# Patient Record
Sex: FEMALE | Race: WHITE | Employment: UNEMPLOYED | URBAN - METROPOLITAN AREA
[De-identification: names, ages, dates, MRNs, and addresses within clinical notes are randomized per-mention and may not be internally consistent; named-entity substitution may affect disease eponyms.]

---

## 2019-07-12 ENCOUNTER — OFFICE VISIT (OUTPATIENT)
Dept: FAMILY MEDICINE CLINIC | Age: 69
End: 2019-07-12
Payer: COMMERCIAL

## 2019-07-12 VITALS
SYSTOLIC BLOOD PRESSURE: 122 MMHG | BODY MASS INDEX: 24.2 KG/M2 | RESPIRATION RATE: 16 BRPM | WEIGHT: 136.6 LBS | OXYGEN SATURATION: 98 % | TEMPERATURE: 97.4 F | DIASTOLIC BLOOD PRESSURE: 76 MMHG | HEART RATE: 83 BPM | HEIGHT: 63 IN

## 2019-07-12 DIAGNOSIS — R00.2 PALPITATION: ICD-10-CM

## 2019-07-12 DIAGNOSIS — Z00.00 WELL ADULT EXAM: ICD-10-CM

## 2019-07-12 DIAGNOSIS — K76.0 FATTY LIVER: ICD-10-CM

## 2019-07-12 DIAGNOSIS — E78.5 HYPERLIPIDEMIA, UNSPECIFIED HYPERLIPIDEMIA TYPE: Primary | ICD-10-CM

## 2019-07-12 DIAGNOSIS — Z23 NEED FOR VACCINATION: ICD-10-CM

## 2019-07-12 PROCEDURE — 90472 IMMUNIZATION ADMIN EACH ADD: CPT | Performed by: FAMILY MEDICINE

## 2019-07-12 PROCEDURE — 90632 HEPA VACCINE ADULT IM: CPT | Performed by: FAMILY MEDICINE

## 2019-07-12 PROCEDURE — 99203 OFFICE O/P NEW LOW 30 MIN: CPT | Performed by: FAMILY MEDICINE

## 2019-07-12 PROCEDURE — 90471 IMMUNIZATION ADMIN: CPT | Performed by: FAMILY MEDICINE

## 2019-07-12 PROCEDURE — 90746 HEPB VACCINE 3 DOSE ADULT IM: CPT | Performed by: FAMILY MEDICINE

## 2019-07-12 RX ORDER — ERYTHROMYCIN 5 MG/G
OINTMENT OPHTHALMIC
Refills: 1 | COMMUNITY
Start: 2019-04-08

## 2019-07-12 RX ORDER — OFLOXACIN 3 MG/ML
SOLUTION/ DROPS OPHTHALMIC
Refills: 1 | COMMUNITY
Start: 2019-06-13

## 2019-07-12 RX ORDER — SERTRALINE HYDROCHLORIDE 100 MG/1
TABLET, FILM COATED ORAL
COMMUNITY
Start: 2018-02-19 | End: 2019-07-12 | Stop reason: ALTCHOICE

## 2019-07-12 RX ORDER — ESOMEPRAZOLE MAGNESIUM 20 MG/1
TABLET, DELAYED RELEASE ORAL
COMMUNITY
End: 2019-11-20 | Stop reason: SDUPTHER

## 2019-07-12 RX ORDER — LEVOTHYROXINE SODIUM 88 UG/1
TABLET ORAL
COMMUNITY
Start: 2015-11-30 | End: 2019-10-18 | Stop reason: SDUPTHER

## 2019-07-12 RX ORDER — TRAZODONE HYDROCHLORIDE 50 MG/1
50 TABLET ORAL
COMMUNITY
Start: 2015-06-30 | End: 2019-07-12 | Stop reason: ALTCHOICE

## 2019-07-12 RX ORDER — AMITRIPTYLINE HYDROCHLORIDE 25 MG/1
TABLET, FILM COATED ORAL
Refills: 0 | COMMUNITY
Start: 2019-04-26 | End: 2020-06-04 | Stop reason: CLARIF

## 2019-07-12 SDOH — HEALTH STABILITY: MENTAL HEALTH: HOW OFTEN DO YOU HAVE A DRINK CONTAINING ALCOHOL?: NEVER

## 2019-07-12 ASSESSMENT — ENCOUNTER SYMPTOMS
COUGH: 0
CHEST TIGHTNESS: 0
BACK PAIN: 0
ABDOMINAL PAIN: 0
CHANGE IN BOWEL HABIT: 0
NAUSEA: 0
SHORTNESS OF BREATH: 0
VOMITING: 0

## 2019-07-12 ASSESSMENT — PATIENT HEALTH QUESTIONNAIRE - PHQ9
2. FEELING DOWN, DEPRESSED OR HOPELESS: 0
1. LITTLE INTEREST OR PLEASURE IN DOING THINGS: 0
SUM OF ALL RESPONSES TO PHQ9 QUESTIONS 1 & 2: 0
SUM OF ALL RESPONSES TO PHQ QUESTIONS 1-9: 0
SUM OF ALL RESPONSES TO PHQ QUESTIONS 1-9: 0

## 2019-07-25 LAB
A/G RATIO: 1.4 (ref 1.1–2.2)
ALBUMIN SERPL-MCNC: 4 G/DL (ref 3.4–5)
ALP BLD-CCNC: 105 U/L (ref 40–129)
ALT SERPL-CCNC: 13 U/L (ref 10–40)
ANION GAP SERPL CALCULATED.3IONS-SCNC: 12 MMOL/L (ref 3–16)
AST SERPL-CCNC: 17 U/L (ref 15–37)
BASOPHILS ABSOLUTE: 0 K/UL (ref 0–0.2)
BASOPHILS RELATIVE PERCENT: 0.6 %
BILIRUB SERPL-MCNC: <0.2 MG/DL (ref 0–1)
BUN BLDV-MCNC: 16 MG/DL (ref 7–20)
CALCIUM SERPL-MCNC: 9.1 MG/DL (ref 8.3–10.6)
CHLORIDE BLD-SCNC: 101 MMOL/L (ref 99–110)
CHOLESTEROL, TOTAL: 216 MG/DL (ref 0–199)
CO2: 27 MMOL/L (ref 21–32)
CREAT SERPL-MCNC: 0.6 MG/DL (ref 0.6–1.2)
EOSINOPHILS ABSOLUTE: 0.1 K/UL (ref 0–0.6)
EOSINOPHILS RELATIVE PERCENT: 2.6 %
GFR AFRICAN AMERICAN: >60
GFR NON-AFRICAN AMERICAN: >60
GLOBULIN: 2.9 G/DL
GLUCOSE BLD-MCNC: 90 MG/DL (ref 70–99)
HCT VFR BLD CALC: 34.7 % (ref 36–48)
HDLC SERPL-MCNC: 46 MG/DL (ref 40–60)
HEMOGLOBIN: 11.2 G/DL (ref 12–16)
HEPATITIS C ANTIBODY INTERPRETATION: NORMAL
LDL CHOLESTEROL CALCULATED: 151 MG/DL
LYMPHOCYTES ABSOLUTE: 2.2 K/UL (ref 1–5.1)
LYMPHOCYTES RELATIVE PERCENT: 41.7 %
MCH RBC QN AUTO: 28.9 PG (ref 26–34)
MCHC RBC AUTO-ENTMCNC: 32.4 G/DL (ref 31–36)
MCV RBC AUTO: 89.5 FL (ref 80–100)
MONOCYTES ABSOLUTE: 0.4 K/UL (ref 0–1.3)
MONOCYTES RELATIVE PERCENT: 7 %
NEUTROPHILS ABSOLUTE: 2.6 K/UL (ref 1.7–7.7)
NEUTROPHILS RELATIVE PERCENT: 48.1 %
PDW BLD-RTO: 15 % (ref 12.4–15.4)
PLATELET # BLD: 389 K/UL (ref 135–450)
PMV BLD AUTO: 7.7 FL (ref 5–10.5)
POTASSIUM SERPL-SCNC: 4.7 MMOL/L (ref 3.5–5.1)
RBC # BLD: 3.88 M/UL (ref 4–5.2)
SODIUM BLD-SCNC: 140 MMOL/L (ref 136–145)
TOTAL PROTEIN: 6.9 G/DL (ref 6.4–8.2)
TRIGL SERPL-MCNC: 97 MG/DL (ref 0–150)
TSH SERPL DL<=0.05 MIU/L-ACNC: 8.08 UIU/ML (ref 0.27–4.2)
VLDLC SERPL CALC-MCNC: 19 MG/DL
WBC # BLD: 5.4 K/UL (ref 4–11)

## 2019-07-30 ENCOUNTER — TELEPHONE (OUTPATIENT)
Dept: FAMILY MEDICINE CLINIC | Age: 69
End: 2019-07-30

## 2019-07-30 RX ORDER — AMITRIPTYLINE HYDROCHLORIDE 25 MG/1
25 TABLET, FILM COATED ORAL NIGHTLY
Qty: 30 TABLET | Refills: 0 | Status: SHIPPED | OUTPATIENT
Start: 2019-07-30 | End: 2019-08-28 | Stop reason: SDUPTHER

## 2019-07-30 RX ORDER — AMITRIPTYLINE HYDROCHLORIDE 25 MG/1
TABLET, FILM COATED ORAL
Qty: 30 TABLET | Refills: 0 | Status: CANCELLED | OUTPATIENT
Start: 2019-07-30

## 2019-08-19 ENCOUNTER — TELEPHONE (OUTPATIENT)
Dept: FAMILY MEDICINE CLINIC | Age: 69
End: 2019-08-19

## 2019-08-19 ENCOUNTER — OFFICE VISIT (OUTPATIENT)
Dept: FAMILY MEDICINE CLINIC | Age: 69
End: 2019-08-19
Payer: COMMERCIAL

## 2019-08-19 VITALS
DIASTOLIC BLOOD PRESSURE: 78 MMHG | BODY MASS INDEX: 24.36 KG/M2 | OXYGEN SATURATION: 98 % | WEIGHT: 137.5 LBS | HEIGHT: 63 IN | HEART RATE: 78 BPM | SYSTOLIC BLOOD PRESSURE: 120 MMHG

## 2019-08-19 DIAGNOSIS — D50.9 IRON DEFICIENCY ANEMIA, UNSPECIFIED IRON DEFICIENCY ANEMIA TYPE: ICD-10-CM

## 2019-08-19 DIAGNOSIS — E03.9 HYPOTHYROIDISM, UNSPECIFIED TYPE: ICD-10-CM

## 2019-08-19 DIAGNOSIS — E78.5 HYPERLIPIDEMIA, UNSPECIFIED HYPERLIPIDEMIA TYPE: ICD-10-CM

## 2019-08-19 DIAGNOSIS — I47.1 SVT (SUPRAVENTRICULAR TACHYCARDIA) (HCC): Primary | ICD-10-CM

## 2019-08-19 PROCEDURE — 99214 OFFICE O/P EST MOD 30 MIN: CPT | Performed by: FAMILY MEDICINE

## 2019-08-19 RX ORDER — EZETIMIBE 10 MG/1
TABLET ORAL
COMMUNITY
Start: 2019-07-24 | End: 2019-11-20 | Stop reason: SDUPTHER

## 2019-08-19 RX ORDER — LANOLIN ALCOHOL/MO/W.PET/CERES
CREAM (GRAM) TOPICAL
Qty: 45 TABLET | Refills: 3 | Status: SHIPPED | OUTPATIENT
Start: 2019-08-19 | End: 2019-11-20 | Stop reason: SDUPTHER

## 2019-08-19 ASSESSMENT — ENCOUNTER SYMPTOMS
VOMITING: 0
NAUSEA: 0
SHORTNESS OF BREATH: 1
RECTAL PAIN: 0
BLOOD IN STOOL: 0
ABDOMINAL DISTENTION: 0
COUGH: 0

## 2019-08-19 NOTE — PROGRESS NOTES
weakness and light-headedness. Negative for numbness. Psychiatric/Behavioral: The patient is not nervous/anxious. is allergic to simvastatin; lactose; and sulfa antibiotics. Current Outpatient Medications:     diltiazem (CARDIZEM) 30 MG tablet, Take 30 mg every 6 hours as needed for tachycardia, maximum daily dose 120 mg, Disp: , Rfl:     ezetimibe (ZETIA) 10 MG tablet, Take one tablet by mouth every day., Disp: , Rfl:     ferrous sulfate (FE TABS) 325 (65 Fe) MG EC tablet, Take one twice a week, Disp: 45 tablet, Rfl: 3    amitriptyline (ELAVIL) 25 MG tablet, Take 1 tablet by mouth nightly, Disp: 30 tablet, Rfl: 0    Esomeprazole Magnesium 20 MG TBEC, Take by mouth, Disp: , Rfl:     amitriptyline (ELAVIL) 25 MG tablet, TK 1 T PO HS, Disp: , Rfl: 0    erythromycin (ROMYCIN) 5 MG/GM ophthalmic ointment, , Disp: , Rfl: 1    levothyroxine (SYNTHROID) 88 MCG tablet, TAKE 1 TABLET BY MOUTH DAILY, Disp: , Rfl:     Multiple Vitamins-Minerals (WOMENS MULTI VITAMIN & MINERAL) TABS, daily, Disp: , Rfl:     ofloxacin (OCUFLOX) 0.3 % solution, INT 1 GTT IN OS BID, Disp: , Rfl: 1    vitamin E 100 units capsule, Take 100 Units by mouth, Disp: , Rfl:      has a past medical history of Fatty liver, Generalized osteoarthritis, Hyperlipidemia, Hypothyroid, Interstitial cystitis, and Rosacea. Past Surgical History:   Procedure Laterality Date    BLEPHAROPLASTY      BREAST LUMPECTOMY Left     WRIST SURGERY Left         reports that she has never smoked. She has never used smokeless tobacco. She reports that she does not drink alcohol or use drugs. family history is not on file. Objective:  Blood pressure 120/78, pulse 78, height 5' 3\" (1.6 m), weight 137 lb 8 oz (62.4 kg), SpO2 98 %, not currently breastfeeding. Physical Exam   Constitutional: She is oriented to person, place, and time. She appears well-developed and well-nourished. No distress. HENT:   Head: Normocephalic.    Mouth/Throat: Oropharynx is clear and moist.   Eyes: Pupils are equal, round, and reactive to light. Conjunctivae and EOM are normal. No scleral icterus. Neck: Normal range of motion. Neck supple. No JVD present. No thyromegaly present. No carotid bruits   Cardiovascular: Normal rate, regular rhythm, normal heart sounds and intact distal pulses. Exam reveals no gallop and no friction rub. No murmur heard. Pulses:       Carotid pulses are 2+ on the right side, and 2+ on the left side. No edema     Pulmonary/Chest: Effort normal. No respiratory distress. She has no wheezes. She has no rales. She exhibits no tenderness. Abdominal: Soft. Bowel sounds are normal. She exhibits no mass. There is no tenderness. Musculoskeletal: She exhibits no edema. Lymphadenopathy:     She has no cervical adenopathy. Neurological: She is alert and oriented to person, place, and time. She has normal reflexes. No cranial nerve deficit. She exhibits normal muscle tone. Skin: Skin is warm. Capillary refill takes less than 2 seconds. No pallor. Psychiatric: She has a normal mood and affect. Her behavior is normal. Judgment and thought content normal.   Nursing note and vitals reviewed. Assessment:             Diagnosis Orders   1. SVT (supraventricular tachycardia) ([address]     2. Hyperlipidemia, unspecified hyperlipidemia type     3. Iron deficiency anemia, unspecified iron deficiency anemia type     4. Hypothyroidism, unspecified type         Plan:      1. Continue diltiazem 120 mg XL and 30 mg for breakthrough sx   Fu with cardiology  Holter results pending    2. Continue Zetia     3. Needs fu colonoscopy   Will call gi after cardiology consult  Take iron twice per week    4.  Continue synthroid  Adjust dose once consult with cardiology  patient agrees and verbalizes understanding    Fu 3 mo            Luis Norton MD

## 2019-08-29 RX ORDER — AMITRIPTYLINE HYDROCHLORIDE 25 MG/1
TABLET, FILM COATED ORAL
Qty: 30 TABLET | Refills: 5 | Status: SHIPPED | OUTPATIENT
Start: 2019-08-29 | End: 2019-11-20 | Stop reason: SDUPTHER

## 2019-09-16 ENCOUNTER — HOSPITAL ENCOUNTER (OUTPATIENT)
Dept: MAMMOGRAPHY | Age: 69
Discharge: HOME | End: 2019-09-21
Payer: COMMERCIAL

## 2019-09-16 DIAGNOSIS — Z12.31 VISIT FOR SCREENING MAMMOGRAM: ICD-10-CM

## 2019-09-16 PROCEDURE — 77063 BREAST TOMOSYNTHESIS BI: CPT

## 2019-10-21 RX ORDER — LEVOTHYROXINE SODIUM 88 UG/1
TABLET ORAL
Qty: 90 TABLET | Refills: 1 | Status: SHIPPED | OUTPATIENT
Start: 2019-10-21 | End: 2019-11-20 | Stop reason: SDUPTHER

## 2019-10-22 RX ORDER — IPRATROPIUM BROMIDE 21 UG/1
SPRAY, METERED NASAL
Qty: 30 ML | Refills: 0 | Status: SHIPPED | OUTPATIENT
Start: 2019-10-22 | End: 2019-11-04 | Stop reason: SDUPTHER

## 2019-11-04 RX ORDER — IPRATROPIUM BROMIDE 21 UG/1
SPRAY, METERED NASAL
Qty: 30 ML | Refills: 0 | Status: SHIPPED | OUTPATIENT
Start: 2019-11-04 | End: 2020-07-10 | Stop reason: SDUPTHER

## 2019-11-20 ENCOUNTER — OFFICE VISIT (OUTPATIENT)
Dept: FAMILY MEDICINE CLINIC | Age: 69
End: 2019-11-20
Payer: COMMERCIAL

## 2019-11-20 VITALS
TEMPERATURE: 98.1 F | HEART RATE: 77 BPM | OXYGEN SATURATION: 98 % | WEIGHT: 139.5 LBS | HEIGHT: 63 IN | SYSTOLIC BLOOD PRESSURE: 128 MMHG | DIASTOLIC BLOOD PRESSURE: 74 MMHG | BODY MASS INDEX: 24.72 KG/M2 | RESPIRATION RATE: 16 BRPM

## 2019-11-20 DIAGNOSIS — E53.8 VITAMIN B12 DEFICIENCY: ICD-10-CM

## 2019-11-20 DIAGNOSIS — I47.1 SVT (SUPRAVENTRICULAR TACHYCARDIA) (HCC): ICD-10-CM

## 2019-11-20 DIAGNOSIS — K21.9 GASTROESOPHAGEAL REFLUX DISEASE WITHOUT ESOPHAGITIS: ICD-10-CM

## 2019-11-20 DIAGNOSIS — E55.9 VITAMIN D DEFICIENCY: ICD-10-CM

## 2019-11-20 DIAGNOSIS — K76.0 FATTY LIVER: ICD-10-CM

## 2019-11-20 DIAGNOSIS — E78.5 HYPERLIPIDEMIA, UNSPECIFIED HYPERLIPIDEMIA TYPE: Primary | ICD-10-CM

## 2019-11-20 DIAGNOSIS — E03.9 HYPOTHYROIDISM, UNSPECIFIED TYPE: ICD-10-CM

## 2019-11-20 DIAGNOSIS — D50.9 IRON DEFICIENCY ANEMIA, UNSPECIFIED IRON DEFICIENCY ANEMIA TYPE: ICD-10-CM

## 2019-11-20 DIAGNOSIS — F51.04 CHRONIC INSOMNIA: ICD-10-CM

## 2019-11-20 PROCEDURE — 99215 OFFICE O/P EST HI 40 MIN: CPT | Performed by: FAMILY MEDICINE

## 2019-11-20 RX ORDER — EZETIMIBE 10 MG/1
TABLET ORAL
Qty: 90 TABLET | Refills: 2 | Status: SHIPPED | OUTPATIENT
Start: 2019-11-20

## 2019-11-20 RX ORDER — LANOLIN ALCOHOL/MO/W.PET/CERES
CREAM (GRAM) TOPICAL
Qty: 45 TABLET | Refills: 3 | Status: SHIPPED | OUTPATIENT
Start: 2019-11-20

## 2019-11-20 RX ORDER — AMITRIPTYLINE HYDROCHLORIDE 25 MG/1
TABLET, FILM COATED ORAL
Qty: 30 TABLET | Refills: 5 | Status: SHIPPED | OUTPATIENT
Start: 2019-11-20 | End: 2020-06-04 | Stop reason: CLARIF

## 2019-11-20 RX ORDER — ESOMEPRAZOLE MAGNESIUM 20 MG/1
TABLET, DELAYED RELEASE ORAL
Qty: 90 TABLET | Refills: 0 | Status: SHIPPED | OUTPATIENT
Start: 2019-11-20 | End: 2020-04-17

## 2019-11-20 RX ORDER — LEVOTHYROXINE SODIUM 88 UG/1
TABLET ORAL
Qty: 90 TABLET | Refills: 1 | Status: SHIPPED | OUTPATIENT
Start: 2019-11-20 | End: 2020-07-24

## 2019-11-20 ASSESSMENT — ENCOUNTER SYMPTOMS
NAUSEA: 0
CHOKING: 0
SWOLLEN GLANDS: 0
STRIDOR: 0
BELCHING: 0
WHEEZING: 0
ABDOMINAL PAIN: 0
HEARTBURN: 1
GLOBUS SENSATION: 0
VISUAL CHANGE: 0
COUGH: 0
CHANGE IN BOWEL HABIT: 0
HOARSE VOICE: 0
WATER BRASH: 0
SORE THROAT: 0
VOMITING: 0

## 2019-11-20 ASSESSMENT — PATIENT HEALTH QUESTIONNAIRE - PHQ9
1. LITTLE INTEREST OR PLEASURE IN DOING THINGS: 0
2. FEELING DOWN, DEPRESSED OR HOPELESS: 0
SUM OF ALL RESPONSES TO PHQ9 QUESTIONS 1 & 2: 0
SUM OF ALL RESPONSES TO PHQ QUESTIONS 1-9: 0
SUM OF ALL RESPONSES TO PHQ QUESTIONS 1-9: 0

## 2019-11-21 ASSESSMENT — ENCOUNTER SYMPTOMS
CONSTIPATION: 0
DIARRHEA: 0
SHORTNESS OF BREATH: 0
BLOOD IN STOOL: 0
ABDOMINAL DISTENTION: 0
CHEST TIGHTNESS: 0

## 2019-12-19 ENCOUNTER — HOSPITAL ENCOUNTER (OUTPATIENT)
Dept: ULTRASOUND IMAGING | Age: 69
Discharge: HOME | End: 2019-12-19
Payer: COMMERCIAL

## 2019-12-19 DIAGNOSIS — K76.0 FATTY LIVER: ICD-10-CM

## 2019-12-19 PROCEDURE — 76705 ECHO EXAM OF ABDOMEN: CPT

## 2020-04-23 ENCOUNTER — TELEMEDICINE (OUTPATIENT)
Dept: FAMILY MEDICINE CLINIC | Age: 70
End: 2020-04-23
Payer: COMMERCIAL

## 2020-04-23 ENCOUNTER — E-VISIT (OUTPATIENT)
Dept: FAMILY MEDICINE CLINIC | Age: 70
End: 2020-04-23

## 2020-04-23 PROCEDURE — 99214 OFFICE O/P EST MOD 30 MIN: CPT | Performed by: FAMILY MEDICINE

## 2020-04-23 RX ORDER — AMOXICILLIN AND CLAVULANATE POTASSIUM 875; 125 MG/1; MG/1
1 TABLET, FILM COATED ORAL 2 TIMES DAILY
Qty: 14 TABLET | Refills: 0 | Status: SHIPPED | OUTPATIENT
Start: 2020-04-23 | End: 2020-04-30

## 2020-04-23 RX ORDER — FLUTICASONE PROPIONATE 50 MCG
2 SPRAY, SUSPENSION (ML) NASAL DAILY
Qty: 1 BOTTLE | Refills: 3 | Status: SHIPPED | OUTPATIENT
Start: 2020-04-23 | End: 2020-09-17 | Stop reason: ALTCHOICE

## 2020-04-23 RX ORDER — AMITRIPTYLINE HYDROCHLORIDE 25 MG/1
25 TABLET, FILM COATED ORAL NIGHTLY
Qty: 30 TABLET | Refills: 0 | Status: SHIPPED | OUTPATIENT
Start: 2020-04-23 | End: 2020-06-04 | Stop reason: CLARIF

## 2020-04-23 ASSESSMENT — ENCOUNTER SYMPTOMS
SINUS PRESSURE: 1
SORE THROAT: 0
SHORTNESS OF BREATH: 0
COUGH: 1
SWOLLEN GLANDS: 0
HOARSE VOICE: 0
EYE ITCHING: 0

## 2020-04-23 NOTE — PROGRESS NOTES
Sent a [employer] informing pt. Including the RRsat virtual visit guide. Also left a message on pt's vm to call back to schedule.

## 2020-04-23 NOTE — PROGRESS NOTES
Cancel myChart encounter  Needs VV to discuss medicsal tx in case I have to prescribe controlled med

## 2020-04-23 NOTE — PROGRESS NOTES
daily, Disp: 1 Bottle, Rfl: 3    amitriptyline (ELAVIL) 25 MG tablet, Take 1 tablet by mouth nightly, Disp: 30 tablet, Rfl: 0    esomeprazole (NEXIUM) 20 MG delayed release capsule, TAKE 1 CAPSULE BY MOUTH EVERY MORNING, Disp: 90 capsule, Rfl: 1    amitriptyline (ELAVIL) 25 MG tablet, TAKE 1 TABLET BY MOUTH EVERY NIGHT, Disp: 30 tablet, Rfl: 5    ferrous sulfate (FE TABS) 325 (65 Fe) MG EC tablet, Take one twice a week, Disp: 45 tablet, Rfl: 3    levothyroxine (SYNTHROID) 88 MCG tablet, TAKE 1 TABLET BY MOUTH DAILY, Disp: 90 tablet, Rfl: 1    ezetimibe (ZETIA) 10 MG tablet, Take one tablet by mouth every day., Disp: 90 tablet, Rfl: 2    ipratropium (ATROVENT) 0.03 % nasal spray, USE 2 SPRAYS IN EACH NOSTRIL THREE TIMES DAILY, Disp: 30 mL, Rfl: 0    diltiazem (CARDIZEM) 30 MG tablet, Take 30 mg every 6 hours as needed for tachycardia, maximum daily dose 120 mg, Disp: , Rfl:     amitriptyline (ELAVIL) 25 MG tablet, TK 1 T PO HS, Disp: , Rfl: 0    erythromycin (ROMYCIN) 5 MG/GM ophthalmic ointment, , Disp: , Rfl: 1    Multiple Vitamins-Minerals (WOMENS MULTI VITAMIN & MINERAL) TABS, daily, Disp: , Rfl:     ofloxacin (OCUFLOX) 0.3 % solution, INT 1 GTT IN OS BID, Disp: , Rfl: 1    vitamin E 100 units capsule, Take 100 Units by mouth, Disp: , Rfl:      has a past medical history of Fatty liver, Generalized osteoarthritis, Hyperlipidemia, Hypothyroid, Interstitial cystitis, and Rosacea. Past Surgical History:   Procedure Laterality Date    BLEPHAROPLASTY      BREAST LUMPECTOMY Left     WRIST SURGERY Left         reports that she has never smoked. She has never used smokeless tobacco. She reports that she does not drink alcohol or use drugs. family history is not on file. Objective:   Physical Exam  Constitutional:       General: She is not in acute distress. Appearance: Normal appearance. She is not ill-appearing, toxic-appearing or diaphoretic. HENT:      Head: Normocephalic and atraumatic. Vitals/Constitutional/EENT/Resp/CV/GI//MS/Neuro/Skin/Heme-Lymph-Imm. Pursuant to the emergency declaration under the [address] [place] [address] and the [employer] Act, this Virtual Visit was conducted with patient's (and/or legal guardian's) consent, to reduce the patient's risk of exposure to COVID-19 and provide necessary medical care. The patient (and/or legal guardian) has also been advised to contact this office for worsening conditions or problems, and seek emergency medical treatment and/or call 911 if deemed necessary. Patient identification was verified at the start of the visit: Yes    Total time spent for this encounter: NA  Services were provided through a video synchronous discussion virtually to substitute for in-person clinic visit. Patient and provider were located at their individual homes. --Leretha Jeans, MD on 4/23/2020 at 1:38 PM    An electronic signature was used to authenticate this note.     Leretha Jeans, MD

## 2020-05-28 ENCOUNTER — TELEPHONE (OUTPATIENT)
Dept: FAMILY MEDICINE CLINIC | Age: 70
End: 2020-05-28

## 2020-05-28 RX ORDER — AMITRIPTYLINE HYDROCHLORIDE 25 MG/1
TABLET, FILM COATED ORAL
Qty: 30 TABLET | Refills: 0 | Status: CANCELLED | OUTPATIENT
Start: 2020-05-28

## 2020-05-29 RX ORDER — AMITRIPTYLINE HYDROCHLORIDE 50 MG/1
50 TABLET, FILM COATED ORAL NIGHTLY
Qty: 90 TABLET | Refills: 1 | Status: SHIPPED | OUTPATIENT
Start: 2020-05-29

## 2020-06-04 ENCOUNTER — VIRTUAL VISIT (OUTPATIENT)
Dept: FAMILY MEDICINE CLINIC | Age: 70
End: 2020-06-04
Payer: COMMERCIAL

## 2020-06-04 VITALS
BODY MASS INDEX: 24.24 KG/M2 | HEART RATE: 79 BPM | WEIGHT: 142 LBS | HEIGHT: 64 IN | DIASTOLIC BLOOD PRESSURE: 82 MMHG | SYSTOLIC BLOOD PRESSURE: 144 MMHG

## 2020-06-04 PROCEDURE — 99213 OFFICE O/P EST LOW 20 MIN: CPT | Performed by: FAMILY MEDICINE

## 2020-06-04 RX ORDER — ZOLPIDEM TARTRATE 5 MG/1
2.5 TABLET ORAL NIGHTLY PRN
Qty: 15 TABLET | Refills: 1 | Status: SHIPPED | OUTPATIENT
Start: 2020-06-04 | End: 2020-07-04

## 2020-06-04 ASSESSMENT — PATIENT HEALTH QUESTIONNAIRE - PHQ9
1. LITTLE INTEREST OR PLEASURE IN DOING THINGS: 0
2. FEELING DOWN, DEPRESSED OR HOPELESS: 0
SUM OF ALL RESPONSES TO PHQ QUESTIONS 1-9: 0
SUM OF ALL RESPONSES TO PHQ9 QUESTIONS 1 & 2: 0
SUM OF ALL RESPONSES TO PHQ QUESTIONS 1-9: 0

## 2020-06-04 NOTE — PROGRESS NOTES
TABS) 325 (65 Fe) MG EC tablet, Take one twice a week, Disp: 45 tablet, Rfl: 3    levothyroxine (SYNTHROID) 88 MCG tablet, TAKE 1 TABLET BY MOUTH DAILY, Disp: 90 tablet, Rfl: 1    ezetimibe (ZETIA) 10 MG tablet, Take one tablet by mouth every day., Disp: 90 tablet, Rfl: 2    ipratropium (ATROVENT) 0.03 % nasal spray, USE 2 SPRAYS IN EACH NOSTRIL THREE TIMES DAILY, Disp: 30 mL, Rfl: 0    diltiazem (CARDIZEM) 30 MG tablet, Take 30 mg every 6 hours as needed for tachycardia, maximum daily dose 120 mg, Disp: , Rfl:     erythromycin (ROMYCIN) 5 MG/GM ophthalmic ointment, , Disp: , Rfl: 1    Multiple Vitamins-Minerals (WOMENS MULTI VITAMIN & MINERAL) TABS, daily, Disp: , Rfl:     ofloxacin (OCUFLOX) 0.3 % solution, INT 1 GTT IN OS BID, Disp: , Rfl: 1    vitamin E 100 units capsule, Take 100 Units by mouth, Disp: , Rfl:      has a past medical history of Fatty liver, Generalized osteoarthritis, Hyperlipidemia, Hypothyroid, Interstitial cystitis, and Rosacea. Past Surgical History:   Procedure Laterality Date    BLEPHAROPLASTY      BREAST LUMPECTOMY Left     WRIST SURGERY Left         reports that she has never smoked. She has never used smokeless tobacco. She reports that she does not drink alcohol or use drugs. family history is sig     Objective:  Blood pressure (!) 144/82, pulse 79, height 5' 4\" (1.626 m), weight 142 lb (64.4 kg), not currently breastfeeding. Physical Exam  Constitutional:       General: She is not in acute distress. HENT:      Head:      Comments: Hearing intact to nml conversation     Neurological:      Mental Status: She is alert and oriented to person, place, and time. Psychiatric:         Mood and Affect: Mood normal.         Behavior: Behavior normal.         Thought Content: Thought content normal.         Judgment: Judgment normal.         Assessment:       Diagnosis Orders   1.  Chronic insomnia  zolpidem (AMBIEN) 5 MG tablet           Plan:      Sleep hygiene reviewed

## 2020-06-08 ENCOUNTER — TELEPHONE (OUTPATIENT)
Dept: FAMILY MEDICINE CLINIC | Age: 70
End: 2020-06-08

## 2020-06-08 RX ORDER — AMOXICILLIN 875 MG/1
875 TABLET, COATED ORAL 2 TIMES DAILY
Qty: 20 TABLET | Refills: 0 | Status: SHIPPED | OUTPATIENT
Start: 2020-06-08 | End: 2020-06-18

## 2020-06-08 NOTE — TELEPHONE ENCOUNTER
Patients  is calling stating that patient started with a sinus infection and she had penicillin from the dentis left over so she took them and started feeling better but is now out and feeling bad again.  He symptoms pain and swelling in face, no fever, sore throat, nasal congestion  Since yesterday morning    Please advise  Jessie Cannon [phone]

## 2020-07-10 ENCOUNTER — VIRTUAL VISIT (OUTPATIENT)
Dept: FAMILY MEDICINE CLINIC | Age: 70
End: 2020-07-10
Payer: COMMERCIAL

## 2020-07-10 PROCEDURE — 99443 PR PHYS/QHP TELEPHONE EVALUATION 21-30 MIN: CPT | Performed by: FAMILY MEDICINE

## 2020-07-10 RX ORDER — QUETIAPINE FUMARATE 25 MG/1
25 TABLET, FILM COATED ORAL 2 TIMES DAILY
Qty: 60 TABLET | Refills: 3 | Status: SHIPPED | OUTPATIENT
Start: 2020-07-10 | End: 2020-11-23 | Stop reason: SDUPTHER

## 2020-07-10 RX ORDER — IPRATROPIUM BROMIDE 21 UG/1
SPRAY, METERED NASAL
Qty: 30 ML | Refills: 0 | Status: SHIPPED | OUTPATIENT
Start: 2020-07-10

## 2020-07-10 ASSESSMENT — ENCOUNTER SYMPTOMS
ABDOMINAL PAIN: 0
CHANGE IN BOWEL HABIT: 0
SORE THROAT: 0
SWOLLEN GLANDS: 0
CHEST TIGHTNESS: 0
EYE ITCHING: 0
VOMITING: 0
VISUAL CHANGE: 0
COUGH: 0
NAUSEA: 1
SHORTNESS OF BREATH: 0

## 2020-07-10 ASSESSMENT — PATIENT HEALTH QUESTIONNAIRE - PHQ9
SUM OF ALL RESPONSES TO PHQ QUESTIONS 1-9: 2
SUM OF ALL RESPONSES TO PHQ9 QUESTIONS 1 & 2: 2
2. FEELING DOWN, DEPRESSED OR HOPELESS: 1
1. LITTLE INTEREST OR PLEASURE IN DOING THINGS: 1
SUM OF ALL RESPONSES TO PHQ QUESTIONS 1-9: 2

## 2020-07-10 NOTE — PROGRESS NOTES
coughing, diaphoresis, fever, headaches, joint swelling, myalgias, neck pain, numbness, rash, sore throat, swollen glands, urinary symptoms, vertigo, visual change, vomiting or weakness. Exacerbated by: stress, ambien. She has tried nothing for the symptoms. Insomnia   Symptoms had been very difficult to control with different medications including amitriptyline, the counter medications and Ambien    Hypothyroidism:  Now on replacement therapy, denies  slow thought process, tremor, hair loss, diaphoresis, heat/cold intolerance, wt gain/loss, diarrhea/constipation. + worsening fatigue,     Anemia :  Denies abn bleeding    Vit d deficiency history, she is not taking supplement    Rhinitis:  Sx improved with atrovent  Denies : fever/chills/  Occasional pnd      Review of Systems   Constitutional: Positive for activity change, appetite change and fatigue. Negative for chills, diaphoresis, fever and unexpected weight change. HENT: Positive for congestion. Negative for sore throat. Eyes: Negative for itching and visual disturbance. Respiratory: Negative for cough, chest tightness and shortness of breath. Cardiovascular: Negative for chest pain and palpitations. Gastrointestinal: Positive for nausea. Negative for abdominal pain, anorexia, change in bowel habit and vomiting. Musculoskeletal: Negative for arthralgias, joint swelling, myalgias and neck pain. Skin: Negative for pallor and rash. Allergic/Immunologic: Positive for environmental allergies. Neurological: Negative for vertigo, facial asymmetry, weakness, numbness and headaches. Psychiatric/Behavioral: Positive for decreased concentration, dysphoric mood and sleep disturbance. Negative for agitation, behavioral problems, confusion, hallucinations, self-injury and suicidal ideas. The patient is nervous/anxious. The patient is not hyperactive.       is allergic to Jomar Schein tartrate]; simvastatin; lactose; and sulfa antibiotics. Current Outpatient Medications:     ipratropium (ATROVENT) 0.03 % nasal spray, USE 2 SPRAYS IN EACH NOSTRIL THREE TIMES DAILY, Disp: 30 mL, Rfl: 0    QUEtiapine (SEROQUEL) 25 MG tablet, Take 1 tablet by mouth 2 times daily, Disp: 60 tablet, Rfl: 3    amitriptyline (ELAVIL) 50 MG tablet, Take 1 tablet by mouth nightly, Disp: 90 tablet, Rfl: 1    fluticasone (FLONASE) 50 MCG/ACT nasal spray, 2 sprays by Nasal route daily, Disp: 1 Bottle, Rfl: 3    esomeprazole (NEXIUM) 20 MG delayed release capsule, TAKE 1 CAPSULE BY MOUTH EVERY MORNING, Disp: 90 capsule, Rfl: 1    ferrous sulfate (FE TABS) 325 (65 Fe) MG EC tablet, Take one twice a week, Disp: 45 tablet, Rfl: 3    levothyroxine (SYNTHROID) 88 MCG tablet, TAKE 1 TABLET BY MOUTH DAILY, Disp: 90 tablet, Rfl: 1    ezetimibe (ZETIA) 10 MG tablet, Take one tablet by mouth every day., Disp: 90 tablet, Rfl: 2    diltiazem (CARDIZEM) 30 MG tablet, Take 30 mg every 6 hours as needed for tachycardia, maximum daily dose 120 mg, Disp: , Rfl:     erythromycin (ROMYCIN) 5 MG/GM ophthalmic ointment, , Disp: , Rfl: 1    Multiple Vitamins-Minerals (WOMENS MULTI VITAMIN & MINERAL) TABS, daily, Disp: , Rfl:     ofloxacin (OCUFLOX) 0.3 % solution, INT 1 GTT IN OS BID, Disp: , Rfl: 1    vitamin E 100 units capsule, Take 100 Units by mouth, Disp: , Rfl:      has a past medical history of Fatty liver, Generalized osteoarthritis, Hyperlipidemia, Hypothyroid, Interstitial cystitis, and Rosacea. Past Surgical History:   Procedure Laterality Date    BLEPHAROPLASTY      BREAST LUMPECTOMY Left     WRIST SURGERY Left         reports that she has never smoked. She has never used smokeless tobacco. She reports that she does not drink alcohol or use drugs. family history is not sig     Objective:   Physical Exam  Constitutional:       General: She is not in acute distress.   HENT:      Head:      Comments: Hearing intact to nml conversation     Neurological:

## 2020-07-20 ENCOUNTER — NURSE ONLY (OUTPATIENT)
Dept: FAMILY MEDICINE CLINIC | Age: 70
End: 2020-07-20

## 2020-07-20 DIAGNOSIS — E03.9 HYPOTHYROIDISM, UNSPECIFIED TYPE: ICD-10-CM

## 2020-07-20 DIAGNOSIS — R53.83 FATIGUE, UNSPECIFIED TYPE: ICD-10-CM

## 2020-07-20 LAB
A/G RATIO: 1.4 (ref 1.1–2.2)
ALBUMIN SERPL-MCNC: 4.1 G/DL (ref 3.4–5)
ALP BLD-CCNC: 103 U/L (ref 40–129)
ALT SERPL-CCNC: 16 U/L (ref 10–40)
ANION GAP SERPL CALCULATED.3IONS-SCNC: 10 MMOL/L (ref 3–16)
AST SERPL-CCNC: 20 U/L (ref 15–37)
BASOPHILS ABSOLUTE: 0 K/UL (ref 0–0.2)
BASOPHILS RELATIVE PERCENT: 1.2 %
BILIRUB SERPL-MCNC: <0.2 MG/DL (ref 0–1)
BUN BLDV-MCNC: 12 MG/DL (ref 7–20)
CALCIUM SERPL-MCNC: 9.1 MG/DL (ref 8.3–10.6)
CHLORIDE BLD-SCNC: 99 MMOL/L (ref 99–110)
CHOLESTEROL, TOTAL: 181 MG/DL (ref 0–199)
CO2: 27 MMOL/L (ref 21–32)
CREAT SERPL-MCNC: 0.5 MG/DL (ref 0.6–1.2)
EOSINOPHILS ABSOLUTE: 0.1 K/UL (ref 0–0.6)
EOSINOPHILS RELATIVE PERCENT: 3.1 %
GFR AFRICAN AMERICAN: >60
GFR NON-AFRICAN AMERICAN: >60
GLOBULIN: 2.9 G/DL
GLUCOSE BLD-MCNC: 90 MG/DL (ref 70–99)
HCT VFR BLD CALC: 38 % (ref 36–48)
HDLC SERPL-MCNC: 49 MG/DL (ref 40–60)
HEMOGLOBIN: 12.3 G/DL (ref 12–16)
IRON SATURATION: 30 % (ref 15–50)
IRON: 81 UG/DL (ref 37–145)
LDL CHOLESTEROL CALCULATED: 112 MG/DL
LYMPHOCYTES ABSOLUTE: 2 K/UL (ref 1–5.1)
LYMPHOCYTES RELATIVE PERCENT: 47.9 %
MCH RBC QN AUTO: 29.1 PG (ref 26–34)
MCHC RBC AUTO-ENTMCNC: 32.4 G/DL (ref 31–36)
MCV RBC AUTO: 89.8 FL (ref 80–100)
MONOCYTES ABSOLUTE: 0.3 K/UL (ref 0–1.3)
MONOCYTES RELATIVE PERCENT: 6.7 %
NEUTROPHILS ABSOLUTE: 1.7 K/UL (ref 1.7–7.7)
NEUTROPHILS RELATIVE PERCENT: 41.1 %
PDW BLD-RTO: 14.2 % (ref 12.4–15.4)
PLATELET # BLD: 391 K/UL (ref 135–450)
PMV BLD AUTO: 7 FL (ref 5–10.5)
POTASSIUM SERPL-SCNC: 4.1 MMOL/L (ref 3.5–5.1)
RBC # BLD: 4.23 M/UL (ref 4–5.2)
SODIUM BLD-SCNC: 136 MMOL/L (ref 136–145)
TOTAL IRON BINDING CAPACITY: 272 UG/DL (ref 260–445)
TOTAL PROTEIN: 7 G/DL (ref 6.4–8.2)
TRIGL SERPL-MCNC: 100 MG/DL (ref 0–150)
TSH SERPL DL<=0.05 MIU/L-ACNC: 11.09 UIU/ML (ref 0.27–4.2)
VITAMIN B-12: >2000 PG/ML (ref 211–911)
VITAMIN D 25-HYDROXY: 38.8 NG/ML
VLDLC SERPL CALC-MCNC: 20 MG/DL
WBC # BLD: 4.1 K/UL (ref 4–11)

## 2020-07-20 PROCEDURE — 36415 COLL VENOUS BLD VENIPUNCTURE: CPT | Performed by: FAMILY MEDICINE

## 2020-07-21 LAB
ESTIMATED AVERAGE GLUCOSE: 116.9 MG/DL
HBA1C MFR BLD: 5.7 %

## 2020-07-21 RX ORDER — LEVOTHYROXINE SODIUM 112 UG/1
112 TABLET ORAL DAILY
Qty: 90 TABLET | Refills: 1 | Status: SHIPPED | OUTPATIENT
Start: 2020-07-21 | End: 2021-02-18 | Stop reason: SDUPTHER

## 2020-07-24 ENCOUNTER — TELEPHONE (OUTPATIENT)
Dept: FAMILY MEDICINE CLINIC | Age: 70
End: 2020-07-24

## 2020-07-24 RX ORDER — DIAZEPAM 5 MG/1
TABLET ORAL
Qty: 15 TABLET | Refills: 0 | Status: SHIPPED | OUTPATIENT
Start: 2020-07-24 | End: 2020-07-31

## 2020-07-24 NOTE — TELEPHONE ENCOUNTER
[phone] ()  OV: 7/10/2020    Patient has been having issues with the medication she is taking. Patient is still having trouble falling asleep. When taken during the day, patient sleeps all day. Patient is not able to sleep at night with the medication. Patient would like a different medication. QUEtiapine (SEROQUEL) 25 MG tablet     Please advise.

## 2020-07-24 NOTE — TELEPHONE ENCOUNTER
Try valium hs prn    instruct caution with driving or handling of heavy machinery while taking  this medication as it  can cause drowsiness,       Stop seroquel (quetiapine)

## 2020-08-13 ENCOUNTER — NURSE TRIAGE (OUTPATIENT)
Dept: OTHER | Facility: CLINIC | Age: 70
End: 2020-08-13

## 2020-08-13 ENCOUNTER — TELEMEDICINE (OUTPATIENT)
Dept: FAMILY MEDICINE CLINIC | Age: 70
End: 2020-08-13
Payer: COMMERCIAL

## 2020-08-13 PROCEDURE — 99442 PR PHYS/QHP TELEPHONE EVALUATION 11-20 MIN: CPT | Performed by: FAMILY MEDICINE

## 2020-08-13 RX ORDER — AMOXICILLIN 875 MG/1
875 TABLET, COATED ORAL 2 TIMES DAILY
Qty: 20 TABLET | Refills: 0 | Status: SHIPPED | OUTPATIENT
Start: 2020-08-13 | End: 2020-08-23

## 2020-08-13 ASSESSMENT — ENCOUNTER SYMPTOMS
COUGH: 0
ABDOMINAL PAIN: 0
BACK PAIN: 0
NAUSEA: 0
DIARRHEA: 0
HOARSE VOICE: 0
SWOLLEN GLANDS: 0
SHORTNESS OF BREATH: 0
CHEST TIGHTNESS: 0
VOMITING: 0
SORE THROAT: 0
SINUS PRESSURE: 1

## 2020-08-13 NOTE — PROGRESS NOTES
Subjective:      Patient ID: Elba Marte is a 72 y.o. female. Elba Marte is a 72 y.o. female evaluated via telephone on 8/13/2020. Consent:  She and/or health care decision maker is aware that that she may receive a bill for this telephone service, depending on her insurance coverage, and has provided verbal consent to proceed: Yes      Documentation:  I communicated with the patient and/or health care decision maker about cc. Details of this discussion including any medical advice provided: yes      I affirm this is a Patient Initiated Episode with a Patient who has not had a related appointment within my department in the past 7 days or scheduled within the next 24 hours. Patient identification was verified at the start of the visit: Yes    Total Time: minutes: 11-20 minutes    Note: not billable if this call serves to triage the patient into an appointment for the relevant concern      Onesimo Long     Sinusitis   This is a new problem. The current episode started in the past 7 days. The problem has been gradually worsening since onset. There has been no fever. The pain is moderate. Associated symptoms include congestion, ear pain, headaches and sinus pressure. Pertinent negatives include no chills, coughing, diaphoresis, hoarse voice, neck pain, shortness of breath, sneezing, sore throat or swollen glands. Treatments tried: atrovent nasal. The treatment provided no relief. Hypothyroidism:  Now on replacement therapy, denies any fatigue, slow thought process, tremor, hair loss, diaphoresis, heat/cold intolerance, wt gain/loss, diarrhea/constipation. Her fatigue had improved    Insomnia  Improved with seroquel 50 mg hs   No sec effects      Review of Systems   Constitutional: Negative for activity change, chills, diaphoresis, fatigue and fever. HENT: Positive for congestion, ear pain and sinus pressure. Negative for hoarse voice, sneezing and sore throat.     Respiratory: Negative for cough, chest tightness and shortness of breath. Gastrointestinal: Negative for abdominal pain, diarrhea, nausea and vomiting. Musculoskeletal: Negative for back pain and neck pain. Neurological: Positive for headaches. Psychiatric/Behavioral: Negative for behavioral problems and sleep disturbance. The patient is not nervous/anxious. is allergic to Jomar Schein tartrate]; simvastatin; lactose; and sulfa antibiotics.       Current Outpatient Medications:     amoxicillin (AMOXIL) 875 MG tablet, Take 1 tablet by mouth 2 times daily for 10 days, Disp: 20 tablet, Rfl: 0    levothyroxine (SYNTHROID) 112 MCG tablet, Take 1 tablet by mouth daily, Disp: 90 tablet, Rfl: 1    ipratropium (ATROVENT) 0.03 % nasal spray, USE 2 SPRAYS IN EACH NOSTRIL THREE TIMES DAILY, Disp: 30 mL, Rfl: 0    QUEtiapine (SEROQUEL) 25 MG tablet, Take 1 tablet by mouth 2 times daily, Disp: 60 tablet, Rfl: 3    amitriptyline (ELAVIL) 50 MG tablet, Take 1 tablet by mouth nightly, Disp: 90 tablet, Rfl: 1    fluticasone (FLONASE) 50 MCG/ACT nasal spray, 2 sprays by Nasal route daily, Disp: 1 Bottle, Rfl: 3    esomeprazole (NEXIUM) 20 MG delayed release capsule, TAKE 1 CAPSULE BY MOUTH EVERY MORNING, Disp: 90 capsule, Rfl: 1    ferrous sulfate (FE TABS) 325 (65 Fe) MG EC tablet, Take one twice a week, Disp: 45 tablet, Rfl: 3    ezetimibe (ZETIA) 10 MG tablet, Take one tablet by mouth every day., Disp: 90 tablet, Rfl: 2    diltiazem (CARDIZEM) 30 MG tablet, Take 30 mg every 6 hours as needed for tachycardia, maximum daily dose 120 mg, Disp: , Rfl:     erythromycin (ROMYCIN) 5 MG/GM ophthalmic ointment, , Disp: , Rfl: 1    Multiple Vitamins-Minerals (WOMENS MULTI VITAMIN & MINERAL) TABS, daily, Disp: , Rfl:     ofloxacin (OCUFLOX) 0.3 % solution, INT 1 GTT IN OS BID, Disp: , Rfl: 1    vitamin E 100 units capsule, Take 100 Units by mouth, Disp: , Rfl:      has a past medical history of Fatty liver, Generalized osteoarthritis, Hyperlipidemia, Hypothyroid, Interstitial cystitis, and Rosacea. Past Surgical History:   Procedure Laterality Date    BLEPHAROPLASTY      BREAST LUMPECTOMY Left     WRIST SURGERY Left         reports that she has never smoked. She has never used smokeless tobacco. She reports that she does not drink alcohol or use drugs. family history is not sig     Objective:  No bp reported for this apt   Physical Exam  Constitutional:       General: She is not in acute distress. HENT:      Head:      Comments: Hearing intact to nml conversation     Neurological:      Mental Status: She is alert and oriented to person, place, and time. Comments: Speech normal     Psychiatric:         Mood and Affect: Mood normal.         Assessment:      Sinusitis frontal   Hypothyroid  Insomnia        Plan: 1. Try to push more fluids  Neti Pot at bedtime and as needed ([employer] or the Internet for the ceramic ones)  Antibiotics until complete amox    NSAID's like Ibuprofen/Naprosyn prn   Antihistamine of choice (Claritin, Zyrtec, Allegra) as needed for postnasal drip  Sudafed or Mucinex-D (OTC) as needed for any additional congestion  Tylenol as needed for pain / fever  Follow up in 5-7 days if not improving   Sinusitis instructions given      2. Dose recently adjusted  Re check tsh on mid Sep    3. Improved  Continue same medications no changes needed at this time   Sleep hygiene reviewed with patient, including: not watching tv, use of computer or reading in bed, no heavy meals or exercise at least 3 hours before bedtime, regular bedtime schedule, lights off and quiet environment.          Fu 6 mo  And prn         Kvng Flaherty MD

## 2020-08-13 NOTE — TELEPHONE ENCOUNTER
Received call from  [address] [place] [address]. Agrees with discharge disposition. Care advice given. Call soft transferred to [facility] to schedule appointment. Please do not reply to the triage nurse through this encounter. Any subsequent communication should be directly with the patient. Pt was cleaning and stirred up a lot of dust. Now with congestion and pressure in sinus, runny nose. Started with symptoms on Monday. Minor pain mostly pressure, 4. No chills or symptoms of fever. Also has question regarding sleep medications.     Reason for Disposition   Earache    Protocols used: SINUS PAIN OR CONGESTION-ADULT-

## 2020-09-10 ENCOUNTER — TELEPHONE (OUTPATIENT)
Dept: FAMILY MEDICINE CLINIC | Age: 70
End: 2020-09-10

## 2020-09-10 NOTE — TELEPHONE ENCOUNTER
Patients  is calling stating that pt is still having sinus pressure and its daily since about 2 months.  They would like to know what to do    [phone]

## 2020-09-17 ENCOUNTER — OFFICE VISIT (OUTPATIENT)
Dept: ENT CLINIC | Age: 70
End: 2020-09-17
Payer: COMMERCIAL

## 2020-09-17 VITALS
HEIGHT: 64 IN | DIASTOLIC BLOOD PRESSURE: 87 MMHG | BODY MASS INDEX: 23.73 KG/M2 | SYSTOLIC BLOOD PRESSURE: 132 MMHG | HEART RATE: 80 BPM | TEMPERATURE: 97.3 F | WEIGHT: 139 LBS

## 2020-09-17 PROCEDURE — 31231 NASAL ENDOSCOPY DX: CPT | Performed by: STUDENT IN AN ORGANIZED HEALTH CARE EDUCATION/TRAINING PROGRAM

## 2020-09-17 PROCEDURE — 99243 OFF/OP CNSLTJ NEW/EST LOW 30: CPT | Performed by: STUDENT IN AN ORGANIZED HEALTH CARE EDUCATION/TRAINING PROGRAM

## 2020-09-17 RX ORDER — FLUTICASONE PROPIONATE 50 MCG
2 SPRAY, SUSPENSION (ML) NASAL 2 TIMES DAILY
Qty: 2 BOTTLE | Refills: 5 | Status: SHIPPED | OUTPATIENT
Start: 2020-09-17

## 2020-09-17 RX ORDER — DOXYCYCLINE HYCLATE 100 MG
100 TABLET ORAL 2 TIMES DAILY
Qty: 20 TABLET | Refills: 0 | Status: SHIPPED | OUTPATIENT
Start: 2020-09-17 | End: 2020-09-27

## 2020-09-17 NOTE — PROGRESS NOTES
[facility]      Patient Name: Homer Hicks [facility],[address] Record Number:  [id number]  Primary Care Physician:  Onesimo Long MD  Date of Consultation: 9/17/2020     Chief Complaint:   Chief Complaint   Patient presents with    Sinus Problem     Patient here today because she has sinus pressure and pain, her nose runs constantly and the is clear drainage that runs down the back of her throat, amoxicillian helped with the swelling in her face but not the pain        [address] is a(n) 72 y.o. female who presents for evaluation of multiple sinonasal complaints. The patient states that she has a history of multiple sinus infections every year. She has already had several sinus infections this year and has just completed a course of antibiotics to address her most recent sinus infection which ended approximately a week ago. Yi Neves Despite this she continues to have significant facial pain and pressure, constant nasal drainage both anterior and posterior, as well as nasal obstruction. She has tried multiple nasal sprays in the past with no improvement. She does not think anything else is helped her. She is required multiple rounds of steroids for this in the past.  She is also currently taking Sudafed and Advil around the clock to address her symptoms. She does not feel that this is helped her to this point. She has never been allergy tested in the past.  She has never had sinus surgery in the past.    I independently reviewed the MRI and the following details are available: This is a new problem. The current episode started in the past 7 days. The problem has been gradually worsening since onset. There has been no fever. The pain is moderate. Associated symptoms include congestion, ear pain, headaches and sinus pressure.  Pertinent negatives include no chills, coughing, diaphoresis, hoarse voice, neck pain, shortness of breath, sneezing, sore throat or swollen glands. Treatments tried: atrovent nasal. The treatment provided no relief. Past medical history includes a history of dry eyes breast mass and chronic sinusitis, hypothyroidism, insomnia. Past Surgical History:   Procedure Laterality Date    BLEPHAROPLASTY      BREAST LUMPECTOMY Left     WRIST SURGERY Left      Family history is significant for atopic disease, as well as allergies. Social History     Tobacco Use    Smoking status: Never Smoker    Smokeless tobacco: Never Used   Substance Use Topics    Alcohol use: Never     Frequency: Never    Drug use: Never        Orders Only on 07/20/2020   Component Date Value Ref Range Status    TSH 07/20/2020 11.09* 0.27 - 4.20 uIU/mL Final    Sodium 07/20/2020 136  136 - 145 mmol/L Final    Potassium 07/20/2020 4.1  3.5 - 5.1 mmol/L Final    Chloride 07/20/2020 99  99 - 110 mmol/L Final    CO2 07/20/2020 27  21 - 32 mmol/L Final    Anion Gap 07/20/2020 10  3 - 16 Final    Glucose 07/20/2020 90  70 - 99 mg/dL Final    BUN 07/20/2020 12  7 - 20 mg/dL Final    CREATININE 07/20/2020 0.5* 0.6 - 1.2 mg/dL Final    GFR Non- 07/20/2020 >60  >60 Final    Comment: >60 mL/min/1.73m2 EGFR, calc. for ages 25 and older using the  MDRD formula (not corrected for weight), is valid for stable  renal function.  GFR  07/20/2020 >60  >60 Final    Comment: Chronic Kidney Disease: less than 60 ml/min/1.73 sq.m. Kidney Failure: less than 15 ml/min/1.73 sq.m. Results valid for patients 18 years and older.       Calcium 07/20/2020 9.1  8.3 - 10.6 mg/dL Final    Total Protein 07/20/2020 7.0  6.4 - 8.2 g/dL Final    Alb 07/20/2020 4.1  3.4 - 5.0 g/dL Final    Albumin/Globulin Ratio 07/20/2020 1.4  1.1 - 2.2 Final    Total Bilirubin 07/20/2020 <0.2  0.0 - 1.0 mg/dL Final    Alkaline Phosphatase 07/20/2020 103  40 - 129 U/L Final    ALT 07/20/2020 16  10 - 40 U/L Final    AST 07/20/2020 20  15 - 37 U/L Final    Globulin 07/20/2020 2.9  g/dL Final        DRUG/FOOD ALLERGIES: Ambien [zolpidem tartrate]; Simvastatin; Lactose; and Sulfa antibiotics    CURRENT MEDICATIONS  Prior to Admission medications    Medication Sig Start Date End Date Taking? Authorizing Provider   fluticasone (FLONASE) 50 MCG/ACT nasal spray 2 sprays by Nasal route 2 times daily 2 sprays each side once/day 9/17/20  Yes Mario Vegas MD   doxycycline hyclate (VIBRA-TABS) 100 MG tablet Take 1 tablet by mouth 2 times daily for 10 days 9/17/20 9/27/20 Yes Mario Vegas MD   levothyroxine (SYNTHROID) 112 MCG tablet Take 1 tablet by mouth daily 7/21/20  Yes Ash Austin MD   esomeprazole (NEXIUM) 20 MG delayed release capsule TAKE 1 CAPSULE BY MOUTH EVERY MORNING 4/17/20  Yes Ash Austin MD   ferrous sulfate (FE TABS) 325 (65 Fe) MG EC tablet Take one twice a week 11/20/19  Yes Ash Austin MD   ezetimibe (ZETIA) 10 MG tablet Take one tablet by mouth every day.  11/20/19  Yes Ash Austin MD   Multiple Vitamins-Minerals (WOMENS MULTI VITAMIN & MINERAL) TABS daily   Yes Historical Provider, MD   vitamin E 100 units capsule Take 100 Units by mouth   Yes Historical Provider, MD   ipratropium (ATROVENT) 0.03 % nasal spray USE 2 SPRAYS IN [facility] NOSTRIL THREE TIMES DAILY 7/10/20   Ash Austin MD   QUEtiapine (SEROQUEL) 25 MG tablet Take 1 tablet by mouth 2 times daily 7/10/20   Ash Austin MD   amitriptyline (ELAVIL) 50 MG tablet Take 1 tablet by mouth nightly 5/29/20   Ash Austin MD   diltiazem (CARDIZEM) 30 MG tablet Take 30 mg every 6 hours as needed for tachycardia, maximum daily dose 120 mg 7/24/19   Historical Provider, MD   erythromycin [facility]) 5 MG/GM ophthalmic ointment  4/8/19   Historical Provider, MD   ofloxacin (OCUFLOX) 0.3 % solution INT 1 GTT IN OS BID 6/13/19   Historical Provider, MD       REVIEW OF SYSTEMS  The following systems were reviewed and revealed the following in addition to any already discussed in the HPI:    CONSTITUTIONAL: no weight loss, no fever, no night sweats, no chills  EYES: no vision changes, no blurry vision  EARS: no changes in hearing, no otalgia  NOSE: no epistaxis, no rhinorrhea  RESPIRATORY: no  Difficulty breathing, no shortness of breath  CV: no chest pain, no Peripheral vascular disease  HEME: No coagulation disorder, no Bleeding disorder  NEURO: no TIA or stroke-like symptoms  SKIN: No new rashes in the head and neck, no recent skin cancers  MOUTH: No new ulcers, no recent teeth infections  GASTROINTESTINAL: No diarrhea, stomach pain  PSYCH: No anxiety, no depression      PHYSICAL EXAM  /87   Pulse 80   Temp 97.3 °F (36.3 °C)   Ht 5' 4\" (1.626 m)   Wt 139 lb (63 kg)   BMI 23.86 kg/m²     GENERAL: No Acute Distress, Alert and Oriented, no Hoarseness, strong voice  EYES: EOMI, Anti-icteric  HENT:   Head: Normocephalic and atraumatic. Face:  Symmetric, facial nerve intact, no sinus tenderness  Right Ear: Normal external ear, normal external auditory canal, intact tympanic membrane with normal mobility and aerated middle ear  Left Ear: Normal external ear, normal external auditory canal, intact tympanic membrane with normal mobility and aerated middle ear  Mouth/Oral Cavity:  normal lips, Uvula is midline, no mucosal lesions, no trismus, normal dentition, normal salivary quality/flow  Oropharynx/Larynx:  normal oropharynx, 1+ tonsils; normal larynx/nasopharynx on mirror exam  Nose:Normal external nasal appearance. Anterior rhinoscopy shows a deviated septum. Hypertrophy of turbinates.   Normal mucosa   NECK: Normal range of motion, no thyromegaly, trachea is midline, no lymphadenopathy, no neck masses, no crepitus  CHEST: Normal respiratory effort, no retractions, breathing comfortably  SKIN: No rashes, normal appearing skin, no evidence of skin lesions/tumors  Neuro:  cranial nerve II-XII intact; normal gait  Cardio:  no edema    PROCEDURE    Nasal Endoscopy CPT 50039    Was performed due to chronic rhinosinusitis    Verbal consent was received. After topical anesthesia and decongestion had been obtained using aerosolized 1% lidocaine and oxymetazoline, a 45 degree rigid endoscope was placed into both nares with the patient in a sitting position. The following was observed:    Right Nasal Cavity and Paranasal Sinuses:  Polyp score = 0 (0 = no polyps, 1 = small polyps in middle meatus not reaching below the inferior border of the middle thomas, 2 = polyps reaching below the middle border of the middle turbinate, 3= large polyps reaching the lower border of the inferior turbinate or polyps medial to the middle thomas, 4= large polyps causing almost complete congestion/obstruction of the interior meatus)  Edema score = 2 (0 = absent, 1 = mild, 2 = severe)  Discharge score = 1 (0 = no discharge, 1 = clear thin discharge, 2 = thick purulent discharge)    Left Nasal Cavity and Paranasal Sinuses:    Polyp score = 0 (0 = no polyps, 1 = small polyps in middle meatus not reaching below the inferior border of the middle thomas, 2 = polyps reaching below the middle border of the middle turbinate, 3= large polyps reaching the lower border of the inferior turbinate or polyps medial to the middle thomas, 4= large polyps causing almost complete congestion/obstruction of the interior meatus)  Edema score = 2 (0 = absent, 1 = mild, 2 = severe)  Discharge score = 2 (0 = no discharge, 1 = clear thin discharge, 2 = thick purulent discharge)    Septum: intact and deviated to the right  Other: The inferior and middle turbinates were examined. The middle meatus, and sphenoethmoid recess was examined bilaterally. There were no complications. Tolerated well without complication. I attest that I was present for and did the entire procedure myself. ASSESSMENT/PLAN  1. Chronic maxillary sinusitis  2. Nasal obstruction  3. Chronic rhinitis    I had the pleasure today of evaluating Ms. Morris for multiple sinonasal complaints. She has a longstanding history of chronic sinus problems dating back to her time living in [facility].  She has never had a formal work-up for allergy testing in the past or any dedicated sinus imaging. Due to the severity of her symptoms and the need for multiple rounds of antibiotics as well as her nasal endoscopy findings I feel that she would benefit from starting doxycycline 100 mg twice daily for 10 days. Additionally I have asked her to begin nasal saline irrigations as well as prescribed her fluticasone 2 sprays twice daily. The hope is that the combination of antibiotics irrigations and nasal steroids will help get her significant sinonasal inflammation under better control. Due to her recurrent sinus infections and treatment failure in the past I do feel it would be helpful to obtain post treatment imaging to evaluate for underlying sinus disease. She expressed understanding and will follow-up with me afterwards. The risks benefits and alternatives to steroids and antibiotics were explained with the patient in detail all questions were answered and she understands accepts the risk. I have performed a head and neck physical exam personally or was physically present during the key or critical portions of the service. Medical Decision Making:   The following items were considered in medical decision making:  Independent review of images  Review / order clinical lab tests  Review / order radiology tests  Decision to obtain old records

## 2020-09-17 NOTE — PATIENT INSTRUCTIONS
1. Start taking 10 days of your antibiotic  2. Begin a twice daily regimen of nasal saline irrigations followed by fluticasone nasal spray. Continue this until instructed to stop  3. Schedule your CT sinus scan for after you complete your antibiotics (2-3 weeks from now). 4. You will see Dr Paulina Ernst back in approximately 4 weeks, after you have completed your CT scan and finished your antibiotics.

## 2020-09-28 RX ORDER — LEVOTHYROXINE SODIUM 88 UG/1
TABLET ORAL
Qty: 90 TABLET | Refills: 1 | Status: SHIPPED | OUTPATIENT
Start: 2020-09-28

## 2020-10-03 ENCOUNTER — HOSPITAL ENCOUNTER (OUTPATIENT)
Dept: CT IMAGING | Age: 70
Discharge: HOME | End: 2020-10-03
Payer: COMMERCIAL

## 2020-10-03 PROCEDURE — 70486 CT MAXILLOFACIAL W/O DYE: CPT

## 2020-10-15 ENCOUNTER — OFFICE VISIT (OUTPATIENT)
Dept: ENT CLINIC | Age: 70
End: 2020-10-15
Payer: COMMERCIAL

## 2020-10-15 VITALS
DIASTOLIC BLOOD PRESSURE: 75 MMHG | HEART RATE: 81 BPM | WEIGHT: 139.6 LBS | BODY MASS INDEX: 24.73 KG/M2 | HEIGHT: 63 IN | SYSTOLIC BLOOD PRESSURE: 125 MMHG | TEMPERATURE: 97.2 F

## 2020-10-15 PROCEDURE — 99213 OFFICE O/P EST LOW 20 MIN: CPT | Performed by: STUDENT IN AN ORGANIZED HEALTH CARE EDUCATION/TRAINING PROGRAM

## 2020-10-15 PROCEDURE — 31231 NASAL ENDOSCOPY DX: CPT | Performed by: STUDENT IN AN ORGANIZED HEALTH CARE EDUCATION/TRAINING PROGRAM

## 2020-10-15 RX ORDER — AZELASTINE 1 MG/ML
2 SPRAY, METERED NASAL 2 TIMES DAILY
Qty: 1 BOTTLE | Refills: 3 | Status: SHIPPED | OUTPATIENT
Start: 2020-10-15

## 2020-10-15 RX ORDER — FEXOFENADINE HCL 180 MG/1
180 TABLET ORAL DAILY
Qty: 30 TABLET | Refills: 3 | Status: SHIPPED | OUTPATIENT
Start: 2020-10-15

## 2020-10-15 NOTE — PROGRESS NOTES
[facility]      Patient Name: Vick Morales,[address] Record Number:  [id number]  Primary Care Physician:  Courtney Leahy MD  Date of Consultation: 10/15/2020     Chief Complaint:   Chief Complaint   Patient presents with    Sinus Problem     She is here for follow up on siinus infection. Everyday she has sinus headache,facial pain, and a drippy nose. She has been using nose sprays everyday and it has helped her symptoms. HISTORY OF PRESENT ILLNESS  Venecia Brothers is a(n) 72 y.o. female who presents for evaluation of multiple sinonasal complaints. The patient states that she has a history of multiple sinus infections every year. She has already had several sinus infections this year and has just completed a course of antibiotics to address her most recent sinus infection which ended approximately a week ago. Max Founds Despite this she continues to have significant facial pain and pressure, constant nasal drainage both anterior and posterior, as well as nasal obstruction. She has tried multiple nasal sprays in the past with no improvement. She does not think anything else is helped her. She is required multiple rounds of steroids for this in the past.  She is also currently taking Sudafed and Advil around the clock to address her symptoms. She does not feel that this is helped her to this point. She has never been allergy tested in the past.  She has never had sinus surgery in the past.    I independently reviewed the MRI and the following details are available: This is a new problem. The current episode started in the past 7 days. The problem has been gradually worsening since onset. There has been no fever. The pain is moderate. Associated symptoms include congestion, ear pain, headaches and sinus pressure.  Pertinent negatives include no chills, coughing, diaphoresis, hoarse voice, neck pain, shortness of breath, sneezing, sore throat or swollen glands. Treatments tried: atrovent nasal. The treatment provided no relief. Past medical history includes a history of dry eyes breast mass and chronic sinusitis, hypothyroidism, insomnia. Update 10/15/2020:    Patient presents today for follow-up. She has been using the sinus irrigations and fluticasone since her last visit. She had a CT sinus scan performed which I independently reviewed and went over with the patient. She has evidence of normal sinuses bilaterally except for some trace fluid in the bilateral sphenoids. She is still having significant facial pain and pressure as well as nasal drainage. This particularly bothersome for her. Past Surgical History:   Procedure Laterality Date    BLEPHAROPLASTY      BREAST LUMPECTOMY Left     HYSTERECTOMY, TOTAL ABDOMINAL      WRIST SURGERY Left      Family history is significant for atopic disease, as well as allergies. Social History     Tobacco Use    Smoking status: Never Smoker    Smokeless tobacco: Never Used   Substance Use Topics    Alcohol use: Never     Frequency: Never    Drug use: Never        Orders Only on 07/20/2020   Component Date Value Ref Range Status    TSH 07/20/2020 11.09* 0.27 - 4.20 uIU/mL Final    Sodium 07/20/2020 136  136 - 145 mmol/L Final    Potassium 07/20/2020 4.1  3.5 - 5.1 mmol/L Final    Chloride 07/20/2020 99  99 - 110 mmol/L Final    CO2 07/20/2020 27  21 - 32 mmol/L Final    Anion Gap 07/20/2020 10  3 - 16 Final    Glucose 07/20/2020 90  70 - 99 mg/dL Final    BUN 07/20/2020 12  7 - 20 mg/dL Final    CREATININE 07/20/2020 0.5* 0.6 - 1.2 mg/dL Final    GFR Non- 07/20/2020 >60  >60 Final    Comment: >60 mL/min/1.73m2 EGFR, calc. for ages 25 and older using the  MDRD formula (not corrected for weight), is valid for stable  renal function.  GFR  07/20/2020 >60  >60 Final    Comment: Chronic Kidney Disease: less than 60 ml/min/1.73 sq.m.           Kidney Failure: less than 15 ml/min/1.73 sq.m. Results valid for patients 18 years and older.  Calcium 07/20/2020 9.1  8.3 - 10.6 mg/dL Final    Total Protein 07/20/2020 7.0  6.4 - 8.2 g/dL Final    Alb 07/20/2020 4.1  3.4 - 5.0 g/dL Final    Albumin/Globulin Ratio 07/20/2020 1.4  1.1 - 2.2 Final    Total Bilirubin 07/20/2020 <0.2  0.0 - 1.0 mg/dL Final    Alkaline Phosphatase 07/20/2020 103  40 - 129 U/L Final    ALT 07/20/2020 16  10 - 40 U/L Final    AST 07/20/2020 20  15 - 37 U/L Final    Globulin 07/20/2020 2.9  g/dL Final        DRUG/FOOD ALLERGIES: Ambien [zolpidem tartrate]; Simvastatin; Lactose; and Sulfa antibiotics    CURRENT MEDICATIONS  Prior to Admission medications    Medication Sig Start Date End Date Taking? Authorizing Provider   levothyroxine (SYNTHROID) 88 MCG tablet TAKE 1 TABLET BY MOUTH DAILY 9/28/20  Yes Laura Conn MD   esomeprazole (NEXIUM) 20 MG delayed release capsule TAKE 1 CAPSULE BY MOUTH EVERY MORNING 9/28/20  Yes Laura Conn MD   fluticasone [facility]) 50 MCG/ACT nasal spray 2 sprays by Nasal route 2 times daily 2 sprays each side once/day 9/17/20  Yes Kristi Almodovar MD   levothyroxine (SYNTHROID) 112 MCG tablet Take 1 tablet by mouth daily 7/21/20  Yes Laura Conn MD   ipratropium (ATROVENT) 0.03 % nasal spray USE 2 SPRAYS IN [facility] NOSTRIL THREE TIMES DAILY 7/10/20  Yes Laura Conn MD   QUEtiapine (SEROQUEL) 25 MG tablet Take 1 tablet by mouth 2 times daily 7/10/20  Yes Laura Conn MD   amitriptyline (ELAVIL) 50 MG tablet Take 1 tablet by mouth nightly 5/29/20  Yes Laura Conn MD   ferrous sulfate (FE TABS) 325 (65 Fe) MG EC tablet Take one twice a week 11/20/19  Yes Laura Conn MD   ezetimibe (ZETIA) 10 MG tablet Take one tablet by mouth every day.  11/20/19  Yes Lauraakash Conn MD   diltiazem (CARDIZEM) 30 MG tablet Take 30 mg every 6 hours as needed for tachycardia, maximum daily dose 120 mg 7/24/19  Yes Historical Provider, MD   erythromycin (ROMYCIN) 5 MG/GM ophthalmic ointment  4/8/19  Yes Historical Provider, MD   Multiple Vitamins-Minerals (WOMENS MULTI VITAMIN & MINERAL) TABS daily   Yes Historical Provider, MD   ofloxacin (OCUFLOX) 0.3 % solution INT 1 GTT IN OS BID 6/13/19  Yes Historical Provider, MD   vitamin E 100 units capsule Take 100 Units by mouth   Yes Historical Provider, MD       REVIEW OF SYSTEMS  The following systems were reviewed and revealed the following in addition to any already discussed in the HPI:    CONSTITUTIONAL: no weight loss, no fever, no night sweats, no chills  EYES: no vision changes, no blurry vision  EARS: no changes in hearing, no otalgia  NOSE: no epistaxis, no rhinorrhea  RESPIRATORY: no  Difficulty breathing, no shortness of breath  CV: no chest pain, no Peripheral vascular disease  HEME: No coagulation disorder, no Bleeding disorder  NEURO: no TIA or stroke-like symptoms  SKIN: No new rashes in the head and neck, no recent skin cancers  MOUTH: No new ulcers, no recent teeth infections  GASTROINTESTINAL: No diarrhea, stomach pain  PSYCH: No anxiety, no depression      PHYSICAL EXAM  /75 (Site: Right Upper Arm, Position: Sitting, Cuff Size: Medium Adult)   Pulse 81   Temp 97.2 °F (36.2 °C) (Infrared)   Ht 5' 3\" (1.6 m)   Wt 139 lb 9.6 oz (63.3 kg)   BMI 24.73 kg/m²     GENERAL: No Acute Distress, Alert and Oriented, no Hoarseness, strong voice  EYES: EOMI, Anti-icteric  HENT:   Head: Normocephalic and atraumatic.    Face:  Symmetric, facial nerve intact, no sinus tenderness  Right Ear: Normal external ear, normal external auditory canal, intact tympanic membrane with normal mobility and aerated middle ear  Left Ear: Normal external ear, normal external auditory canal, intact tympanic membrane with normal mobility and aerated middle ear  Mouth/Oral Cavity:  normal lips, Uvula is midline, no mucosal lesions, no trismus, normal dentition, normal salivary quality/flow  Oropharynx/Larynx:  normal oropharynx, 1+ tonsils; normal larynx/nasopharynx on mirror exam  Nose:Normal external nasal appearance. Anterior rhinoscopy shows a deviated septum. Hypertrophy of turbinates. Normal mucosa   NECK: Normal range of motion, no thyromegaly, trachea is midline, no lymphadenopathy, no neck masses, no crepitus  CHEST: Normal respiratory effort, no retractions, breathing comfortably  SKIN: No rashes, normal appearing skin, no evidence of skin lesions/tumors  Neuro:  cranial nerve II-XII intact; normal gait  Cardio:  no edema    PROCEDURE    Nasal Endoscopy CPT 08308    Was performed due to chronic rhinosinusitis    Verbal consent was received. After topical anesthesia and decongestion had been obtained using aerosolized 1% lidocaine and oxymetazoline, a 45 degree rigid endoscope was placed into both nares with the patient in a sitting position.  The following was observed:    Right Nasal Cavity and Paranasal Sinuses:  Polyp score = 0 (0 = no polyps, 1 = small polyps in middle meatus not reaching below the inferior border of the middle thomas, 2 = polyps reaching below the middle border of the middle turbinate, 3= large polyps reaching the lower border of the inferior turbinate or polyps medial to the middle thomas, 4= large polyps causing almost complete congestion/obstruction of the interior meatus)  Edema score = 1 (0 = absent, 1 = mild, 2 = severe)  Discharge score = 1 (0 = no discharge, 1 = clear thin discharge, 2 = thick purulent discharge)    Left Nasal Cavity and Paranasal Sinuses:    Polyp score = 0 (0 = no polyps, 1 = small polyps in middle meatus not reaching below the inferior border of the middle thomas, 2 = polyps reaching below the middle border of the middle turbinate, 3= large polyps reaching the lower border of the inferior turbinate or polyps medial to the middle thomas, 4= large polyps causing almost complete congestion/obstruction of the interior meatus)  Edema score = 1 (0 = absent, 1 = mild, 2 = severe)  Discharge score = 2 (0 = no discharge, 1 = clear thin discharge, 2 = thick purulent discharge)    Septum: intact and deviated to the right  Other: The inferior and middle turbinates were examined. The middle meatus, and sphenoethmoid recess was examined bilaterally. There were no complications. Tolerated well without complication. I attest that I was present for and did the entire procedure myself. ASSESSMENT/PLAN  1. Chronic sphenoidal sinusitis  2. Nasal obstruction  3. Chronic rhinitis  4. Facial pain and pressure  5. Headaches    I had the pleasure today of evaluating Ms. Morris for multiple sinonasal complaints. At her last visit she was prescribed 10 days of doxycycline as well as twice daily sinus irrigations and nasal steroid sprays. Since her last visit, she has had moderate improvement but she still complains of issues related to chronic rhinitis and now has issues related to facial pain and pressure. Due to her history of recurrent sinus infections, a CT sinus was performed which showed evidence of well aerated sinuses bilaterally with no significant nasal airway obstruction. There was some trace fluid in the bilateral sphenoids that I think is not contributing to her issues. Based on her overall clinical picture, I suspect that her issues are related to allergies. She also could have a component related to headaches, but I would like to maximize treatment of her allergies first prior to sending her for any type of headache work-up. I will add azelastine 2 sprays twice daily as well as fexofenadine to her regimen of sinus irrigations and fluticasone. Should she continue to have issues related to chronic rhinitis, I may consider a trial with Atrovent to see if there is a component of vasomotor rhinitis that could potentially be addressed via a vidian neurectomy procedure. In addition I will refer her to one of my allergy colleagues Dr. Anupama Sweeney [facility].     The risks benefits and alternatives to steroids and antibiotics were explained with the patient in detail all questions were answered and she understands accepts the risk. I have performed a head and neck physical exam personally or was physically present during the key or critical portions of the service. Medical Decision Making:   The following items were considered in medical decision making:  Independent review of images  Review / order clinical lab tests  Review / order radiology tests  Decision to obtain old records

## 2020-11-23 RX ORDER — QUETIAPINE FUMARATE 25 MG/1
25 TABLET, FILM COATED ORAL 2 TIMES DAILY
Qty: 60 TABLET | Refills: 3 | Status: SHIPPED | OUTPATIENT
Start: 2020-11-23 | End: 2021-05-07

## 2021-01-19 DIAGNOSIS — K21.9 GASTROESOPHAGEAL REFLUX DISEASE WITHOUT ESOPHAGITIS: ICD-10-CM

## 2021-01-21 ENCOUNTER — TELEPHONE (OUTPATIENT)
Dept: FAMILY MEDICINE CLINIC | Age: 71
End: 2021-01-21

## 2021-01-21 NOTE — TELEPHONE ENCOUNTER
Message form [facility]    Med: Esomeprazole Magnesium 20 mg DR CAP. Ins will not pay until 9/28/21. For override, a call will need to be made to [phone]. Max Qty of 90 in 365 days, Qty remaining is 0. Next fill date is 9/28/21. Med was last filled on 9/28/20    Ok to call in override? Please advise.

## 2021-02-17 DIAGNOSIS — E03.9 HYPOTHYROIDISM, UNSPECIFIED TYPE: ICD-10-CM

## 2021-02-18 RX ORDER — LEVOTHYROXINE SODIUM 112 UG/1
112 TABLET ORAL DAILY
Qty: 90 TABLET | Refills: 1 | Status: SHIPPED | OUTPATIENT
Start: 2021-02-18